# Patient Record
Sex: MALE | Employment: FULL TIME | URBAN - METROPOLITAN AREA
[De-identification: names, ages, dates, MRNs, and addresses within clinical notes are randomized per-mention and may not be internally consistent; named-entity substitution may affect disease eponyms.]

---

## 2022-07-27 ENCOUNTER — TELEPHONE (OUTPATIENT)
Dept: UROLOGY | Facility: CLINIC | Age: 57
End: 2022-07-27
Payer: COMMERCIAL

## 2022-07-27 NOTE — TELEPHONE ENCOUNTER
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 Told to bring disc and reports and any other information. Given fax number to have reports sent if able. Appt scheduled wrong under established patient needed to change appt to 09/1/ @ 320pm> Wife stated that needing to come the 25th due to scheduled to return to work 6 hours away. Please advise.  MC LPN     MC LPN

## 2022-07-27 NOTE — TELEPHONE ENCOUNTER
----- Message from Luisa Cruz sent at 7/27/2022  3:47 PM CDT -----  Type:  Sooner Apoointment Request    Caller is requesting a sooner appointment.  Caller declined first available appointment listed below.  Caller will not accept being placed on the waitlist and is requesting a message be sent to doctor.    Name of Caller: Adrian Garza  When is the first available appointment? 09/01    Symptoms: two tumors, one on each kidney. One the size of a baseball and the other the size of a softball. Pt was told to follow up with a urologist immediately     Would the patient rather a call back or a response via [facility]? Call back   Best Call Back Number: [phone] (home)     Additional Information: Pt is currently in [place] but wants to be seen as soon as he gets back in town. He will be available 08/04 and any day soon afterwards.

## 2022-07-28 ENCOUNTER — TELEPHONE (OUTPATIENT)
Dept: UROLOGY | Facility: CLINIC | Age: 57
End: 2022-07-28
Payer: COMMERCIAL

## 2022-07-28 NOTE — TELEPHONE ENCOUNTER
Pt wanted us to fax a release to get his records from ER visit. Informed we would need him to come to our office to sign a record release. I informed wife since they are closer to the ER to go there sign release and get ER to fax here. She verbalzied understanding. Wander

## 2022-07-28 NOTE — TELEPHONE ENCOUNTER
----- Message from Luzma Grijalva sent at 7/28/2022  4:16 PM CDT -----  Contact: pt wife      Who Called: mrs rivera  Who Left Message for Patient:  Does the patient know what this is regarding?: needs request to read report from [facility] to emergency care [phone]  Would the patient rather a call back or a response via [facility]?   Best Call Back Number:.[phone]    Additional Information: [phone] Fax

## 2022-07-28 NOTE — TELEPHONE ENCOUNTER
----- Message from Mary Garcia MA sent at 7/28/2022  2:00 PM CDT -----    ----- Message -----  From: Diane Maria  Sent: 7/28/2022   1:59 PM CDT  To: Lise Polo Staff     [facility] need to speak with nurse regarding a requested report. Call back number [phone].Tks

## 2022-08-02 ENCOUNTER — TELEPHONE (OUTPATIENT)
Dept: UROLOGY | Facility: CLINIC | Age: 57
End: 2022-08-02
Payer: COMMERCIAL

## 2022-08-02 NOTE — TELEPHONE ENCOUNTER
----- Message from Luzma Grijalva sent at 8/2/2022  2:32 PM CDT -----  Contact: pt  .Type:  Patient Returning Call    Who Called:pt wife   Who Left Message for Patient:marciano  Does the patient know what this is regarding?:ep patient  Would the patient rather a call back or a response via [facility]?   Best Call Back Number:.[phone]    Additional Information:

## 2022-08-03 ENCOUNTER — TELEPHONE (OUTPATIENT)
Dept: UROLOGY | Facility: CLINIC | Age: 57
End: 2022-08-03
Payer: COMMERCIAL

## 2022-08-03 NOTE — TELEPHONE ENCOUNTER
Confirmed time of appt with patient bring list of all current medications and copy of report. MC LPN    ----- Message from Natividad Salgado LPN sent at 8/2/2022  5:01 PM CDT -----  Regarding: FW: Return Call  Contact: patient    ----- Message -----  From: Katheryn Reese  Sent: 8/2/2022   4:57 PM CDT  To: Lise Polo Staff  Subject: Return Call                                      Type:  Patient Returning Call    Who Called:Adrian  Who Left Message for Patient: Chasity  Does the patient know what this is regarding?: tumors on each kidney   Would the patient rather a call back or a response via [facility]?  Call back   Best Call Back Number: [phone] (home)     Additional Information:     EUGENE Mercedes

## 2022-08-04 ENCOUNTER — OFFICE VISIT (OUTPATIENT)
Dept: UROLOGY | Facility: CLINIC | Age: 57
End: 2022-08-04
Payer: COMMERCIAL

## 2022-08-04 VITALS
DIASTOLIC BLOOD PRESSURE: 81 MMHG | SYSTOLIC BLOOD PRESSURE: 137 MMHG | BODY MASS INDEX: 29.26 KG/M2 | RESPIRATION RATE: 20 BRPM | HEART RATE: 66 BPM | WEIGHT: 209 LBS | HEIGHT: 71 IN

## 2022-08-04 DIAGNOSIS — N28.1 RENAL CYST: Primary | ICD-10-CM

## 2022-08-04 PROCEDURE — 4010F PR ACE/ARB THEARPY RXD/TAKEN: ICD-10-PCS | Mod: CPTII,S$GLB,, | Performed by: NURSE PRACTITIONER

## 2022-08-04 PROCEDURE — 1160F PR REVIEW ALL MEDS BY PRESCRIBER/CLIN PHARMACIST DOCUMENTED: ICD-10-PCS | Mod: CPTII,S$GLB,, | Performed by: NURSE PRACTITIONER

## 2022-08-04 PROCEDURE — 3075F SYST BP GE 130 - 139MM HG: CPT | Mod: CPTII,S$GLB,, | Performed by: NURSE PRACTITIONER

## 2022-08-04 PROCEDURE — 3075F PR MOST RECENT SYSTOLIC BLOOD PRESS GE 130-139MM HG: ICD-10-PCS | Mod: CPTII,S$GLB,, | Performed by: NURSE PRACTITIONER

## 2022-08-04 PROCEDURE — 3079F PR MOST RECENT DIASTOLIC BLOOD PRESSURE 80-89 MM HG: ICD-10-PCS | Mod: CPTII,S$GLB,, | Performed by: NURSE PRACTITIONER

## 2022-08-04 PROCEDURE — 99203 PR OFFICE/OUTPT VISIT, NEW, LEVL III, 30-44 MIN: ICD-10-PCS | Mod: S$GLB,,, | Performed by: NURSE PRACTITIONER

## 2022-08-04 PROCEDURE — 1159F MED LIST DOCD IN RCRD: CPT | Mod: CPTII,S$GLB,, | Performed by: NURSE PRACTITIONER

## 2022-08-04 PROCEDURE — 1159F PR MEDICATION LIST DOCUMENTED IN MEDICAL RECORD: ICD-10-PCS | Mod: CPTII,S$GLB,, | Performed by: NURSE PRACTITIONER

## 2022-08-04 PROCEDURE — 3008F BODY MASS INDEX DOCD: CPT | Mod: CPTII,S$GLB,, | Performed by: NURSE PRACTITIONER

## 2022-08-04 PROCEDURE — 3079F DIAST BP 80-89 MM HG: CPT | Mod: CPTII,S$GLB,, | Performed by: NURSE PRACTITIONER

## 2022-08-04 PROCEDURE — 99203 OFFICE O/P NEW LOW 30 MIN: CPT | Mod: S$GLB,,, | Performed by: NURSE PRACTITIONER

## 2022-08-04 PROCEDURE — 4010F ACE/ARB THERAPY RXD/TAKEN: CPT | Mod: CPTII,S$GLB,, | Performed by: NURSE PRACTITIONER

## 2022-08-04 PROCEDURE — 1160F RVW MEDS BY RX/DR IN RCRD: CPT | Mod: CPTII,S$GLB,, | Performed by: NURSE PRACTITIONER

## 2022-08-04 PROCEDURE — 3008F PR BODY MASS INDEX (BMI) DOCUMENTED: ICD-10-PCS | Mod: CPTII,S$GLB,, | Performed by: NURSE PRACTITIONER

## 2022-08-04 RX ORDER — MULTIVIT WITH MINERALS/HERBS
1 TABLET ORAL DAILY
COMMUNITY

## 2022-08-04 RX ORDER — MELOXICAM 15 MG/1
15 TABLET ORAL DAILY
COMMUNITY
Start: 2022-07-18

## 2022-08-04 RX ORDER — ALBUTEROL SULFATE 90 UG/1
AEROSOL, METERED RESPIRATORY (INHALATION)
COMMUNITY
Start: 2022-07-25

## 2022-08-04 RX ORDER — ANASTROZOLE 1 MG/1
1 TABLET ORAL
COMMUNITY
Start: 2022-07-18

## 2022-08-04 RX ORDER — TESTOSTERONE CYPIONATE 200 MG/ML
200 INJECTION, SOLUTION INTRAMUSCULAR
COMMUNITY
Start: 2022-07-22

## 2022-08-04 RX ORDER — LISINOPRIL AND HYDROCHLOROTHIAZIDE 10; 12.5 MG/1; MG/1
1 TABLET ORAL DAILY
COMMUNITY
Start: 2022-07-18

## 2022-08-04 RX ORDER — ESOMEPRAZOLE MAGNESIUM 40 MG/1
40 CAPSULE, DELAYED RELEASE ORAL DAILY
COMMUNITY
Start: 2022-07-18

## 2022-08-04 RX ORDER — CETIRIZINE HYDROCHLORIDE 10 MG/1
TABLET ORAL
COMMUNITY
Start: 2022-07-05

## 2022-08-04 RX ORDER — IBUPROFEN 100 MG/5ML
1000 SUSPENSION, ORAL (FINAL DOSE FORM) ORAL DAILY
COMMUNITY

## 2022-08-04 NOTE — PROGRESS NOTES
Subjective:       Patient ID: Adrian Garza is a 57 y.o. male.    Chief Complaint: Dysuria (Burning at times)      HPI: 49-year-old male, new to [facility], presents with complaint of renal cyst.  Patient states he recently had COVID in early had a workup while in the hospital.  Patient had a CT which showed bilateral renal cyst.  Right renal cyst measures 3.9 x 3.5 cm.  Left renal cyst patient 7.0 x 6.5 cm.    Patient states his PCP did blood work with stable renal function.  Patient denies any difficulty voiding.  Denies any pain or burning urination.  Denies any odor urine.  Denies any fever body.  Denies any blood in urine.  Denies any significant weight loss.  Patient also denies any back pain or flank pain.    No other urinary complaints at this time.       Past Medical History:   Past Medical History:   Diagnosis Date    Allergy     GERD (gastroesophageal reflux disease)     Hypertension     Knee pain        Past Surgical Historical:   Past Surgical History:   Procedure Laterality Date    CHOLECYSTECTOMY      COLON SURGERY          Medications:   Medication List with Changes/Refills   Current Medications    ALBUTEROL (PROVENTIL/VENTOLIN HFA) 90 MCG/ACTUATION INHALER    INHALE TWO (2) PUFF(S) BY MOUTH EVERY FOUR TO SIX HOURS AS NEEDED FOR SHORTNESS OF BREATH, WHEEZING.    ANASTROZOLE (ARIMIDEX) 1 MG TAB    Take 1 mg by mouth every 7 days.    ASCORBIC ACID, VITAMIN C, (VITAMIN C) 1000 MG TABLET    Take 1,000 mg by mouth once daily.    B COMPLEX VITAMINS TABLET    Take 1 tablet by mouth once daily.    CETIRIZINE (ZYRTEC) 10 MG TABLET    TAKE ONE (1) TABLET(S) BY MOUTH EVERY MORNING.    ESOMEPRAZOLE (NEXIUM) 40 MG CAPSULE    Take 40 mg by mouth once daily.    LISINOPRIL-HYDROCHLOROTHIAZIDE (PRINZIDE,ZESTORETIC) 10-12.5 MG PER TABLET    Take 1 tablet by mouth once daily.    MELOXICAM (MOBIC) 15 MG TABLET    Take 15 mg by mouth once daily.    TESTOSTERONE CYPIONATE (DEPOTESTOTERONE CYPIONATE) 200  MG/ML INJECTION    Inject 200 mg into the muscle every 7 days.        Past Social History:   Social History     Socioeconomic History    Marital status:    Tobacco Use    Smoking status: Never Smoker    Smokeless tobacco: Former User     Types: Snuff   Substance and Sexual Activity    Alcohol use: Yes     Alcohol/week: 1.0 standard drink     Types: 1 Cans of beer per week     Comment: 3-4 daily    Drug use: Not Currently    Sexual activity: Yes     Partners: Female       Allergies: Review of patient's allergies indicates:  No Known Allergies     Family History: History reviewed. No pertinent family history.     Review of Systems:  Review of Systems   Constitutional: Negative for activity change and appetite change.   HENT: Negative for congestion and dental problem.    Respiratory: Negative for chest tightness and shortness of breath.    Cardiovascular: Negative for chest pain.   Gastrointestinal: Negative for abdominal distention and abdominal pain.   Genitourinary: Negative for decreased urine volume, difficulty urinating, dysuria, enuresis, flank pain, frequency, genital sores, hematuria, penile discharge, penile pain, penile swelling, scrotal swelling, testicular pain and urgency.   Musculoskeletal: Negative for back pain and neck pain.   Neurological: Negative for dizziness.   Hematological: Negative for adenopathy.   Psychiatric/Behavioral: Negative for agitation, behavioral problems and confusion.       Physical Exam:  Physical Exam  Vitals and nursing note reviewed.   Constitutional:       Appearance: He is well-developed.   HENT:      Head: Normocephalic.   Cardiovascular:      Rate and Rhythm: Normal rate and regular rhythm.      Heart sounds: Normal heart sounds.   Pulmonary:      Effort: Pulmonary effort is normal.      Breath sounds: Normal breath sounds.   Abdominal:      General: Bowel sounds are normal.      Palpations: Abdomen is soft.   Skin:     General: Skin is warm and dry.    Neurological:      Mental Status: He is alert and oriented to person, place, and time.       Urinalysis:  Small bilirubin, trace ketones, trace protein, otherwise normal.  Bladder scan:  3 cc    Assessment/Plan:   Renal cyst:  He had recent CT which shows bilateral renal cysts with no masses, hydronephrosis, stones, or hydroureter.  Patient provided reassurance.  Will plan for for repeat imaging in approximately 6 months.  Did explain to the patient that cyst are benign.    Follow-up 6 months, sooner if needed.  Problem List Items Addressed This Visit    None     Visit Diagnoses     Renal cyst    -  Primary

## 2023-01-16 ENCOUNTER — NURSE TRIAGE (OUTPATIENT)
Dept: ADMINISTRATIVE | Facility: CLINIC | Age: 58
End: 2023-01-16
Payer: COMMERCIAL

## 2024-10-14 ENCOUNTER — TELEPHONE (OUTPATIENT)
Dept: GASTROENTEROLOGY | Facility: CLINIC | Age: 59
End: 2024-10-14
Payer: COMMERCIAL

## 2024-10-14 VITALS — HEIGHT: 70 IN | BODY MASS INDEX: 28.35 KG/M2 | WEIGHT: 198 LBS

## 2024-10-14 DIAGNOSIS — Z12.11 COLON CANCER SCREENING: Primary | ICD-10-CM

## 2024-10-14 RX ORDER — GLIPIZIDE 5 MG/1
5 TABLET, FILM COATED, EXTENDED RELEASE ORAL
COMMUNITY
Start: 2024-10-09

## 2024-10-14 RX ORDER — FENOFIBRATE 145 MG/1
145 TABLET, FILM COATED ORAL
COMMUNITY
Start: 2024-09-11

## 2024-10-14 RX ORDER — LOTILANER OPHTHALMIC SOLUTION 2.5 MG/ML
1 SOLUTION/ DROPS OPHTHALMIC 2 TIMES DAILY
COMMUNITY
Start: 2024-08-29

## 2024-10-14 NOTE — TELEPHONE ENCOUNTER
Patient called to see when his colonoscopy would be. Patient is past due. Patient is not having any current issues. Patient was direct scheduled. 10/14/24 LRA       ----- Message from Xiomara sent at 10/14/2024 11:29 AM CDT -----  Contact: luis - spouse  Patient is requesting a call back regarding asking when does he need a colonoscopy. Please call back at [phone]

## 2024-10-14 NOTE — TELEPHONE ENCOUNTER
Patient denied having any current problems or issues. Patient also denied having any hx of seizures, sleep apnea, or kidney disease. Patient had no prior complications from anesthesia. Patient had previous colonoscopy with Dr. Rodriguez on 6/20/2019. Patient was due for repeat in 3 years. Findings were small grade 1 internal hemorrhoids were noted. No masses or large polyps were seen. Scattered adherent stools throughout the colon. Patients mother and father are both living. Mother has hypertension and father has asthma and had liver cancer at age 77. Grandparents are all unknown except paternal grandfather had a aneurysm on brain. Patients siblings have no issues as well.Chart was reviewed and updated with patient. Prep instructions were reviewed and sent to patients email at [email or web address] and [email or web address].     Colonoscopy on April 3, 2025 by Dr. Ingram.-Reviewed by HESHAM

## 2024-10-14 NOTE — TELEPHONE ENCOUNTER
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 If pt is not having any issues can be direct schedule, otherwise he will need to find a new GI provider.  ----- Message -----  From: Xiomara Roberts  Sent: 10/10/2024   9:35 AM CDT  To: Juan Jose STEWART Staff    Patient is requesting a call back regarding asking when is his next and last colonoscopy. Please call back at [phone]

## 2024-12-16 NOTE — TELEPHONE ENCOUNTER
Pcp not listed. Need clarification of colon surgery in 1998. [facility] says possible sigmoid resection? Will need to clarify reasoning for colon resection. Diverticulitis?  Michael's last colonoscopy had h/o colon polyps as diagnosis.    KN

## 2024-12-30 RX ORDER — SOD SULF/POT CHLORIDE/MAG SULF 1.479 G
TABLET ORAL
Qty: 24 TABLET | Refills: 0 | Status: SHIPPED | OUTPATIENT
Start: 2024-12-30

## 2024-12-30 NOTE — TELEPHONE ENCOUNTER
Spoke with patient and got the name of patient's PCP, updated to chart. Patient had a colectomy-sigmoid resection in 1998 due to a work accident. Patient stated that several hundred pounds of dirt fell on him and pinned him up against an iron wall 2 1/2 feet high and severed his colon. He had a colostomy bag for a few months till everything healed then it was reversed. -ANGELINE,LPN

## 2025-03-27 ENCOUNTER — TELEPHONE (OUTPATIENT)
Dept: GASTROENTEROLOGY | Facility: CLINIC | Age: 60
End: 2025-03-27
Payer: COMMERCIAL

## 2025-03-27 DIAGNOSIS — Z12.11 COLON CANCER SCREENING: Primary | ICD-10-CM

## 2025-03-27 NOTE — TELEPHONE ENCOUNTER
S/w pt and told him that I was calling as a courtesy regarding up coming Colon with NBP on 4/3/25, Thurs and wanted to verify that he has his paper prep instructions and meds. Pt stated he has the instructions, but still needs to pickup his prep meds.  I also mentioned that [facility] will call the day before (WED) with the arrival time, GI Lab is located on the third floor, and to pre-register before next Wed. pallavi

## 2025-03-27 NOTE — TELEPHONE ENCOUNTER
"  [facility]      Provider: Ivette Ingram MD    Patient Name: Adrian ESTRADA (age):1965  59 y.o.           Gender: male   Phone: [phone]     Referring Physician: Dolores Crews     Vital Signs:   Height - 5' 10"  Weight - 198 lb    Plan: Colonoscopy @ [facility]    Encounter Diagnosis   Name Primary?    Colon cancer screening Yes         History:      Past Medical History:   Diagnosis Date    Allergy     BMI 28.4     GERD (gastroesophageal reflux disease)     Hypertension     Knee pain       Past Surgical History:   Procedure Laterality Date    CHOLECYSTECTOMY      COLON SURGERY      sigmoid resection due to severed colon from work accident      Medication List with Changes/Refills   Current Medications    ASCORBIC ACID, VITAMIN C, (VITAMIN C) 1000 MG TABLET    Take 1,000 mg by mouth once daily.    ESOMEPRAZOLE (NEXIUM) 40 MG CAPSULE    Take 40 mg by mouth once daily.    FENOFIBRATE (TRICOR) 145 MG TABLET    Take 145 mg by mouth.    GLIPIZIDE 5 MG TR24    Take 5 mg by mouth.    LISINOPRIL-HYDROCHLOROTHIAZIDE (PRINZIDE,ZESTORETIC) 10-12.5 MG PER TABLET    Take 1 tablet by mouth once daily.    MELOXICAM (MOBIC) 15 MG TABLET    Take 15 mg by mouth once daily.    SOD SULF-POT CHLORIDE-MAG SULF (SUTAB) 1.479-0.188- 0.225 GRAM TABLET    Take according to package instructions with indicated amount of water. No breakfast day before test. May substitute with Suprep, Clenpiq, Plenvu, Moviprep or GoLytely based on Rx plan and patient preference.    TESTOSTERONE CYPIONATE (DEPOTESTOTERONE CYPIONATE) 200 MG/ML INJECTION    Inject 200 mg into the muscle every 7 days.    XDEMVY 0.25 % DROP    Place 1 drop into both eyes 2 (two) times daily.      Review of patient's allergies indicates:  No Known Allergies   Family History   Problem Relation Name Age of Onset    Hypertension Mother      Asthma Father      Liver cancer " Father  77    No Known Problems Maternal Grandmother      No Known Problems Maternal Grandfather      No Known Problems Paternal Grandmother      Aneurysm Paternal Grandfather          brain      Social History[1]        [1]   Social History  Tobacco Use    Smoking status: Never    Smokeless tobacco: Former     Types: Snuff   Substance Use Topics    Alcohol use: Yes     Alcohol/week: 1.0 standard drink of alcohol     Types: 1 Cans of beer per week     Comment: 3-4 daily    Drug use: Not Currently

## 2025-03-28 ENCOUNTER — TELEPHONE (OUTPATIENT)
Dept: GASTROENTEROLOGY | Facility: CLINIC | Age: 60
End: 2025-03-28
Payer: COMMERCIAL

## 2025-03-28 NOTE — TELEPHONE ENCOUNTER
----- Message from Justa sent at 3/28/2025  9:31 AM CDT -----  Contact: Roxana Hamilton (Spouse)  ..Type:  Patient Requesting CallWho Called:Roxana Hamilton (Spouse)Would the patient rather a call back or a response via [facility]? CallBest Call Back Number:[phone] Information: Spouse called to discuss reschedule patient colonoscopy

## 2025-08-12 ENCOUNTER — TELEPHONE (OUTPATIENT)
Dept: GASTROENTEROLOGY | Facility: CLINIC | Age: 60
End: 2025-08-12
Payer: COMMERCIAL

## 2025-08-12 DIAGNOSIS — Z12.11 COLON CANCER SCREENING: Primary | ICD-10-CM

## 2025-08-15 ENCOUNTER — TELEPHONE (OUTPATIENT)
Dept: GASTROENTEROLOGY | Facility: CLINIC | Age: 60
End: 2025-08-15
Payer: COMMERCIAL

## 2025-08-15 DIAGNOSIS — Z12.11 COLON CANCER SCREENING: ICD-10-CM

## 2025-08-18 RX ORDER — SOD SULF/POT CHLORIDE/MAG SULF 1.479 G
TABLET ORAL
Qty: 24 TABLET | Refills: 0 | Status: SHIPPED | OUTPATIENT
Start: 2025-08-18

## 2025-08-19 ENCOUNTER — OUTSIDE PLACE OF SERVICE (OUTPATIENT)
Dept: GASTROENTEROLOGY | Facility: CLINIC | Age: 60
End: 2025-08-19